# Patient Record
Sex: MALE
[De-identification: names, ages, dates, MRNs, and addresses within clinical notes are randomized per-mention and may not be internally consistent; named-entity substitution may affect disease eponyms.]

---

## 2024-08-12 PROBLEM — Z00.00 ENCOUNTER FOR PREVENTIVE HEALTH EXAMINATION: Status: ACTIVE | Noted: 2024-08-12

## 2024-08-13 ENCOUNTER — APPOINTMENT (OUTPATIENT)
Dept: CT IMAGING | Facility: HOSPITAL | Age: 54
End: 2024-08-13

## 2024-08-13 ENCOUNTER — APPOINTMENT (OUTPATIENT)
Dept: NUCLEAR MEDICINE | Facility: HOSPITAL | Age: 54
End: 2024-08-13

## 2024-09-05 ENCOUNTER — APPOINTMENT (OUTPATIENT)
Dept: UROLOGY | Facility: CLINIC | Age: 54
End: 2024-09-05
Payer: COMMERCIAL

## 2024-09-05 VITALS
WEIGHT: 190 LBS | HEIGHT: 74 IN | TEMPERATURE: 97.3 F | SYSTOLIC BLOOD PRESSURE: 160 MMHG | BODY MASS INDEX: 24.38 KG/M2 | HEART RATE: 78 BPM | DIASTOLIC BLOOD PRESSURE: 90 MMHG | OXYGEN SATURATION: 98 %

## 2024-09-05 DIAGNOSIS — C61 MALIGNANT NEOPLASM OF PROSTATE: ICD-10-CM

## 2024-09-05 DIAGNOSIS — R97.20 ELEVATED PROSTATE, SPECIFIC ANTIGEN [PSA]: ICD-10-CM

## 2024-09-05 PROCEDURE — 99204 OFFICE O/P NEW MOD 45 MIN: CPT

## 2024-09-05 NOTE — HISTORY OF PRESENT ILLNESS
[FreeTextEntry1] : CC: prostate cancer   Patient is a 53 year old man  He had a biopsy showing extensive CAP on right side, GS4+4=8 Left side only GG3 5% at left lateral apex  PSA was 23 ng/ml   Bone scan and CT negative  No MRI or PET/PSMA  No family history of prostate cancer  Currently working in construction  Denies any medical comorbidities at all  Denies any prior surgery of any kind

## 2024-09-05 NOTE — ASSESSMENT
[FreeTextEntry1] : Diagnosis: CAP   Today we discussed the natural history of localized prostate cancer, and the heterogeneous biology of this malignancy.  We discussed the fact that many prostate cancers are slow growing and unlikely to metastasize or cause death, whereas others can be life threatening.  We reviewed risk stratification, staging, Margarito scoring, and PSA levels as they pertain to risk.    All treatment options were reviewed.  This included active surveillance, androgen deprivation, emerging options such as focal therapy/HIFU/cryotherapy, radiation options (including IMRT, SBRT, brachytherapy) and surgical options (open vs. robotic surgery, nerve vs. non-nerve sparing).  Oncologic outcomes were compared and contrasted.  Risks of biochemical and clinical recurrence discussed.  Risks of needing adjuvant or salvage treatments reviewed.  We discussed the risks of secondary malignancy after radiation.  We discussed the opportunity for pathological staging with surgery vs. other options.  We discussed the possibility of positive margins, treatment failure, cancer recurrence and cancer-related death even with treatment.   All potential side effects of treatment were reviewed including, but not limited to: short term or permanent stress urinary incontinence and/or short term or permanent erectile dysfunction, penile shortening, rectal symptoms/pain, perineal pain, and other side effects.   All potential complications of treatment and surgery were reviewed including, but not limited to: risks of conversion from MIS to open surgery discussed, bleeding//life-threatening hemorrhage, rectal injury requiring colostomy or delayed recognition leading to fistula, vascular/bowel/adjacent visceral organ injury, trocar/access injury, the possibility of recognized vs. unrecognized/delayed-recognition injury, risks of thermal/blunt/sharp/retraction injury, risks of DVT, PE, MI, death, risks of cardiopulmonary/anesthesia related complications, positional injury, infection/collection/abscess, wound complications/dehiscence/seroma/cellulitis, urinoma/fistula, ureteral injury/obstruction, bladder neck contracture, penile shortening, meatal stenosis, urethral stricture, lymphocele, obturator nerve injury, prolonged anastomotic leak, and other complications.   PLAN  MRI prostate for anatomic delineation and local margin evaluation, SV, etc PET/PSMA for metastatic evaluation given improved sensitivity for metastatic disease  If localized disease, he has elected surgery Will tentatively book SP radical prostatectomy   Farhan Galo MD, FACS, FRCS  of Urology [facility] Director of Laparoscopic and Robotic Surgery [facility] Director of Urology, [facility] Professor of Urology   (Office) [phone] (Cell)  [phone] [email or web address]  The total amount of time I have personally spent preparing for this visit, reviewing the patient's test results, obtaining external history, ordering tests/medications, documenting clinical information, communicating with and counseling the patient/family and/or caregiver(s), and spent face to face with the patient explaining the above was minutes =45

## 2024-09-13 ENCOUNTER — APPOINTMENT (OUTPATIENT)
Dept: NUCLEAR MEDICINE | Facility: HOSPITAL | Age: 54
End: 2024-09-13
Payer: COMMERCIAL

## 2024-09-13 ENCOUNTER — APPOINTMENT (OUTPATIENT)
Dept: MRI IMAGING | Facility: HOSPITAL | Age: 54
End: 2024-09-13
Payer: COMMERCIAL

## 2024-09-13 ENCOUNTER — OUTPATIENT (OUTPATIENT)
Dept: OUTPATIENT SERVICES | Facility: HOSPITAL | Age: 54
LOS: 1 days | End: 2024-09-13
Payer: COMMERCIAL

## 2024-09-13 LAB — GLUCOSE BLDC GLUCOMTR-MCNC: 92 MG/DL — SIGNIFICANT CHANGE UP (ref 70–99)

## 2024-09-13 PROCEDURE — 78816 PET IMAGE W/CT FULL BODY: CPT

## 2024-09-13 PROCEDURE — 78816 PET IMAGE W/CT FULL BODY: CPT | Mod: 26

## 2024-09-13 PROCEDURE — 72197 MRI PELVIS W/O & W/DYE: CPT | Mod: 26

## 2024-09-13 PROCEDURE — 82962 GLUCOSE BLOOD TEST: CPT

## 2024-09-13 PROCEDURE — A9585: CPT

## 2024-09-13 PROCEDURE — 72197 MRI PELVIS W/O & W/DYE: CPT

## 2024-09-13 PROCEDURE — A9595: CPT

## 2024-09-25 NOTE — DISEASE MANAGEMENT
[>20] : >20 ng/mL [Biopsy with Fusion] : Patient had a biopsy with fusion on [8] : Fusion Biopsy Margarito Score: 8 [5] : 5 [BiopsyDate] : 09/2024 [MeasuredProstateVolume] : 38cc

## 2024-09-25 NOTE — HISTORY OF PRESENT ILLNESS
[FreeTextEntry1] :   Mr. Gonzalez [facility] is a 53-year-old male with no family history of Prostate Ca , with no significant past medical history being worked up for elevated PSA of 23 and underwent MRI prostate on 9/13/2024 that revealed PI-RADS 5 , right posterior medial-lateral, base to apex, peripheal zone. Tumor bulges or deforms capsule without neurovascular bundle thickening. Left posterior lateral, base, peripheral zone, tumor abuts but does not deform capsule. Outside pathology revealed Margarito Score 8 ( 4+4) GG3 5% at left lateral apex, a diagnosis of high-risk Prostatic Adenocarcinoma. Mr. Gonzalez is here today to discuss radiation therapy.  Urologist:   PSA Trend: ng/mL  /2024:  23ng/mL  MRI Prostate with and without contrast  9/9/2024  IMPRESSION: Prostate lesions as detailed above. PIRADS 5 - Very high (clinically significant cancer is highly likely to be present)  Tumor extension to the right seminal vesicle.  Prostate Volume: 38 mL PSA density: 0.47 ng/mL/mL  LESION: #1 Location: Right, posterior medial-lateral (PZpm-pl), base-to-apex, peripheral zone. Slice#: Series 4, Image 15. Size (transverse, AP, CC): 23 x 6 x 33 mm. T2-WI: 5 - Circumscribed, homogenous moderate hypointense focus/mass; greater than 1.5 cm in greatest dimension and invasive behavior. DWI: 5 - Focal markedly hypointense on ADC and markedly hyperintense on high b-value DWI; invasive behavior. DCE: Positive - focal, and; earlier than or contemporaneous with enhancement of adjacent normal prostatic tissues, and; corresponds to suspicious finding on T2W and/or DWI. Extra-prostatic extension: Tumor bulges or deforms capsule without neurovascular bundle thickening. Prior Comparison: None. PI-RADS Assessment Category: 5, Very High  LESION: #2 Location: Left, posterior lateral (PZpl), base, peripheral zone. Slice#: Series 4, Image 14. Size (transverse, AP, CC): 8 x 4 x 6 mm. T2-WI: 4 - Circumscribed, homogenous moderate hypointense focus/mass confined to the prostate; less than 1.5 cm in greatest dimension. DWI: 3 - Focal (discrete and different from the background) hypointense on ADC and/or focal hyperintense on high b-value DWI; may be markedly hypointense on ADC or markedly hyperintense on high b-value DWI, but not both. DCE: Negative - no early enhancement. Extra-prostatic extension: Abutment, tumor abuts but does not deform capsule. Prior Comparison: None. PI-RADS Assessment Category: 3, Intermediate  Neurovascular bundle: No evidence of neurovascular bundle invasion. Seminal vesicles: 1 cm focus in the right seminal vesicle consistent with tumor extension involving the right seminal vesicle (series 13 image 124) Lymph nodes: No pelvic adenopathy. Bones: No suspicious lesions identified. Urinary bladder: Within normal limits.   PATHOLOGY   Margarito 8 ( 4+4) GG3, 5% tissue involvement   PET/CT PSMA  9/5/2024  IMPRESSION:  1.  Abnormal uptake in the lesion in the right posterior prostate exclude to seminal vesicle corresponding to patient's known malignancy.  2.  Abnormal uptake within a nonenlarged right para-aortic lymph node which is nonspecific, however samir metastasis cannot be excluded.   Patient presents today for consideration of radiation therapy options to the prostate, referred by Dr. Glao.  Overall, the patient states he feels well and denies any radiation therapy in the past. He notes baseline urine function with and nocturia x___, while ------ using Flomax 0.4mg at night.  He denies urgency, urinary frequency, dribbling, urinary retention, dysuria, hematuria, leakage or incontinence. He has well-formed bowel movements. He denies blood or mucous in the stool. He denies rectal pain and states a history of ____hemorrhoids. He had his last colonoscopy in ____ and his next colonoscopy is due____. He _____ ADT therapy in the past. He is ____ to have erections and uses _______ as an ED medication with good result.

## 2024-09-26 ENCOUNTER — OUTPATIENT (OUTPATIENT)
Dept: OUTPATIENT SERVICES | Facility: HOSPITAL | Age: 54
LOS: 1 days | Discharge: HOME | End: 2024-09-26

## 2024-09-26 DIAGNOSIS — C61 MALIGNANT NEOPLASM OF PROSTATE: ICD-10-CM

## 2024-09-29 ENCOUNTER — NON-APPOINTMENT (OUTPATIENT)
Age: 54
End: 2024-09-29

## 2024-09-30 ENCOUNTER — APPOINTMENT (OUTPATIENT)
Dept: HEMATOLOGY ONCOLOGY | Facility: CLINIC | Age: 54
End: 2024-09-30
Payer: COMMERCIAL

## 2024-09-30 VITALS
TEMPERATURE: 96.4 F | WEIGHT: 209.44 LBS | OXYGEN SATURATION: 96 % | HEIGHT: 73.23 IN | BODY MASS INDEX: 27.46 KG/M2 | RESPIRATION RATE: 16 BRPM | SYSTOLIC BLOOD PRESSURE: 157 MMHG | DIASTOLIC BLOOD PRESSURE: 81 MMHG | HEART RATE: 76 BPM

## 2024-09-30 PROCEDURE — 99205 OFFICE O/P NEW HI 60 MIN: CPT

## 2024-10-01 PROBLEM — Z85.46 HISTORY OF MALIGNANT NEOPLASM OF PROSTATE: Status: RESOLVED | Noted: 2024-10-01 | Resolved: 2024-10-01

## 2024-10-02 ENCOUNTER — APPOINTMENT (OUTPATIENT)
Dept: RADIATION ONCOLOGY | Facility: CLINIC | Age: 54
End: 2024-10-02
Payer: COMMERCIAL

## 2024-10-02 ENCOUNTER — APPOINTMENT (OUTPATIENT)
Dept: HEMATOLOGY ONCOLOGY | Facility: CLINIC | Age: 54
End: 2024-10-02

## 2024-10-02 DIAGNOSIS — Z85.46 PERSONAL HISTORY OF MALIGNANT NEOPLASM OF PROSTATE: ICD-10-CM

## 2024-10-03 ENCOUNTER — NON-APPOINTMENT (OUTPATIENT)
Age: 54
End: 2024-10-03

## 2024-10-03 ENCOUNTER — APPOINTMENT (OUTPATIENT)
Dept: RADIATION ONCOLOGY | Facility: CLINIC | Age: 54
End: 2024-10-03
Payer: COMMERCIAL

## 2024-10-03 VITALS
HEART RATE: 67 BPM | OXYGEN SATURATION: 99 % | SYSTOLIC BLOOD PRESSURE: 134 MMHG | WEIGHT: 205 LBS | HEIGHT: 73 IN | TEMPERATURE: 98.1 F | RESPIRATION RATE: 16 BRPM | DIASTOLIC BLOOD PRESSURE: 86 MMHG | BODY MASS INDEX: 27.17 KG/M2

## 2024-10-03 DIAGNOSIS — C61 MALIGNANT NEOPLASM OF PROSTATE: ICD-10-CM

## 2024-10-03 PROCEDURE — G2211 COMPLEX E/M VISIT ADD ON: CPT | Mod: NC

## 2024-10-03 PROCEDURE — 99205 OFFICE O/P NEW HI 60 MIN: CPT

## 2024-10-03 RX ORDER — AMOXICILLIN AND CLAVULANATE POTASSIUM 875; 125 MG/1; MG/1
875-125 TABLET, COATED ORAL
Qty: 6 | Refills: 0 | Status: ACTIVE | COMMUNITY
Start: 2024-10-03 | End: 1900-01-01

## 2024-10-03 NOTE — ASSESSMENT
[FreeTextEntry1] : Nikolai Gonzalez is a 53 year old male without significant medical history who presents for further evaluation and management of recently diagnosed prostate cancer.   We had an extensive discussion with the patient to review his recent clinical course including biopsy and imaging results, which are consistent with prostate cancer. Based on his [place] score, PSA level, and seminal vesicle invasion, his disease is consistent with at least very high risk localized prostate cancer. On his PSMA PET/CT, there is concern for possible metastasis to retroperitoneal lymph node. We discussed that standard treatment recommendations for patients with both disease settings involve concurrent ADT and abiraterone/prednisone in conjunction with radiation therapy. ADT + izabel/pred are typically continued for 2 years. This regimen is associated with decreased risk of metastasis and improved overall survival. Both ADT and izabel/pred can be associated with significant side effects including fatigue, hot flashes, decreased libido, erectile dysfunction, muscle/bone weakness, and increased cardiovascular risk. Abiraterone can also be associated with hypertension, edema, LFT and electrolyte disturances.   After this discussion, he was amenable with this plan. He is scheduled to see Dr. Mtz this week for discussion of radiation options. We will facilitate initiation of ADT next week at [facility], at which time he will have baseline labs. Abiraterone and prednisone will be sent to [facility]. We will plan to monitor closely for toxicity.

## 2024-10-03 NOTE — REASON FOR VISIT
[Initial Consultation] : an initial consultation [Family Member] : family member [FreeTextEntry2] : Metastatic prostate cancer

## 2024-10-03 NOTE — ASSESSMENT
[FreeTextEntry1] : Nikolai Gonzalez is a 53 year old male without significant medical history who presents for further evaluation and management of recently diagnosed prostate cancer.   We had an extensive discussion with the patient to review his recent clinical course including biopsy and imaging results, which are consistent with prostate cancer. Based on his [place]nel[place] score, PSA level, and seminal vesicle invasion, his disease is consistent with at least very high risk localized prostate cancer. On his PSMA PET/CT, there is concern for possible metastasis to retroperitoneal lymph node. We discussed that standard treatment recommendations for patients with both disease settings involve concurrent ADT and abiraterone/prednisone in conjunction with radiation therapy. ADT + izabel/pred are typically continued for 2 years. This regimen is associated with decreased risk of metastasis and improved overall survival. Both ADT and izabel/pred can be associated with significant side effects including fatigue, hot flashes, decreased libido, erectile dysfunction, muscle/bone weakness, and increased cardiovascular risk. Abiraterone can also be associated with hypertension, edema, LFT and electrolyte disturances.   After this discussion, he was amenable with this plan. He is scheduled to see Dr. Mtz this week for discussion of radiation options. We will facilitate initiation of ADT next week at [facility], at which time he will have baseline labs. Abiraterone and prednisone will be sent to [facility]. We will plan to monitor closely for toxicity.

## 2024-10-03 NOTE — ASSESSMENT
[FreeTextEntry1] : Nikolai Gonzalez is a 53 year old male without significant medical history who presents for further evaluation and management of recently diagnosed prostate cancer.   We had an extensive discussion with the patient to review his recent clinical course including biopsy and imaging results, which are consistent with prostate cancer. Based on his [place] score, PSA level, and seminal vesicle invasion, his disease is consistent with at least very high risk localized prostate cancer. On his PSMA PET/CT, there is concern for possible metastasis to retroperitoneal lymph node. We discussed that standard treatment recommendations for patients with both disease settings involve concurrent ADT and abiraterone/prednisone in conjunction with radiation therapy. ADT + izabel/pred are typically continued for 2 years. This regimen is associated with decreased risk of metastasis and improved overall survival. Both ADT and izabel/pred can be associated with significant side effects including fatigue, hot flashes, decreased libido, erectile dysfunction, muscle/bone weakness, and increased cardiovascular risk. Abiraterone can also be associated with hypertension, edema, LFT and electrolyte disturances.   After this discussion, he was amenable with this plan. He is scheduled to see Dr. Mtz this week for discussion of radiation options. We will facilitate initiation of ADT next week at [facility], at which time he will have baseline labs. Abiraterone and prednisone will be sent to [facility]. We will plan to monitor closely for toxicity.

## 2024-10-03 NOTE — HISTORY OF PRESENT ILLNESS
[Disease: _____________________] : Disease: [unfilled] [T: ___] : T[unfilled] [N: ___] : N[unfilled] [M: ___] : M[unfilled] [AJCC Stage: ____] : AJCC Stage: [unfilled] [de-identified] : Nikolai Gonzalez is a 53-year-old male with recently diagnosed prostate cancer with possible lymph node involvement who presents for further evaluation and management.   7/20/2024: prostate biopsy Margarito 4+4 in 5 cores, 4+3 in 3 cores per outside report. PSA was found to be at 23 ng/mL.  8/26/2024: underwent CT A/P which did not reveal acute intra-abdominal or pelvic abnormality.  8/22/2024: NM Bone Scan which revealed degenerative type changes in the spine and extremities with no obvious suspicious focal uptake to suggest active osteoblastic metastatic disease.  9/5/2024:  PSMA PET/CT which reported abnormal uptake in the lesion in the right posterior prostate exclude to seminal vesicle. Abnormal uptake within a nonenlarged right para - aortic lymph node which is nonspecific, however samir metastasis can't be excluded. 9/13/2024: MRI Prostate revealed PIRADS 5 with tumor extension to the right seminal vesicle.   He reports feeling well without any focal symptoms at this time. Denies urinary symptoms including dysuria, hematuria, hesitancy.  [de-identified] : prostate adenocarcinoma [de-identified] : PSA 7/20/24: 23

## 2024-10-03 NOTE — HISTORY OF PRESENT ILLNESS
[Disease: _____________________] : Disease: [unfilled] [T: ___] : T[unfilled] [N: ___] : N[unfilled] [M: ___] : M[unfilled] [AJCC Stage: ____] : AJCC Stage: [unfilled] [de-identified] : Nikolai Gonzalez is a 53-year-old male with recently diagnosed prostate cancer with possible lymph node involvement who presents for further evaluation and management.   7/20/2024: prostate biopsy Margarito 4+4 in 5 cores, 4+3 in 3 cores per outside report. PSA was found to be at 23 ng/mL.  8/26/2024: underwent CT A/P which did not reveal acute intra-abdominal or pelvic abnormality.  8/22/2024: NM Bone Scan which revealed degenerative type changes in the spine and extremities with no obvious suspicious focal uptake to suggest active osteoblastic metastatic disease.  9/5/2024:  PSMA PET/CT which reported abnormal uptake in the lesion in the right posterior prostate exclude to seminal vesicle. Abnormal uptake within a nonenlarged right para - aortic lymph node which is nonspecific, however samir metastasis can't be excluded. 9/13/2024: MRI Prostate revealed PIRADS 5 with tumor extension to the right seminal vesicle.   He reports feeling well without any focal symptoms at this time. Denies urinary symptoms including dysuria, hematuria, hesitancy.  [de-identified] : prostate adenocarcinoma [de-identified] : PSA 7/20/24: 23

## 2024-10-03 NOTE — HISTORY OF PRESENT ILLNESS
[Disease: _____________________] : Disease: [unfilled] [T: ___] : T[unfilled] [N: ___] : N[unfilled] [M: ___] : M[unfilled] [AJCC Stage: ____] : AJCC Stage: [unfilled] [de-identified] : Nikolai Gonzalez is a 53-year-old male with recently diagnosed prostate cancer with possible lymph node involvement who presents for further evaluation and management.   7/20/2024: prostate biopsy Margarito 4+4 in 5 cores, 4+3 in 3 cores per outside report. PSA was found to be at 23 ng/mL.  8/26/2024: underwent CT A/P which did not reveal acute intra-abdominal or pelvic abnormality.  8/22/2024: NM Bone Scan which revealed degenerative type changes in the spine and extremities with no obvious suspicious focal uptake to suggest active osteoblastic metastatic disease.  9/5/2024:  PSMA PET/CT which reported abnormal uptake in the lesion in the right posterior prostate exclude to seminal vesicle. Abnormal uptake within a nonenlarged right para - aortic lymph node which is nonspecific, however samir metastasis can't be excluded. 9/13/2024: MRI Prostate revealed PIRADS 5 with tumor extension to the right seminal vesicle.   He reports feeling well without any focal symptoms at this time. Denies urinary symptoms including dysuria, hematuria, hesitancy.  [de-identified] : prostate adenocarcinoma [de-identified] : PSA 7/20/24: 23

## 2024-10-04 NOTE — HISTORY OF PRESENT ILLNESS
[FreeTextEntry1] :   Mr. Gonzalez [facility] is a 53-year-old male with no family history of Prostate Ca, with no significant past medical history being worked up for elevated PSA of 23 and underwent MRI prostate on 9/13/2024 that revealed PI-RADS 5, right posterior medial-lateral, base to apex, peripheral zone. Tumor bulges or deforms capsule without neurovascular bundle thickening. Left posterior lateral, base, peripheral zone, tumor abuts but does not deform capsule. Outside pathology revealed 5 out of 12 cores are positive, 30-80% tissue involvement , [place] Score 8 (4+4) GG3 5% at left lateral apex, a diagnosis of high-risk Prostatic Adenocarcinoma. Mr. Gonzalez is here today to discuss radiation therapy.  [place]  # [id number]  Urologist: Dr. Galo  PSA Trend: ng/mL 6/7/2024:  23ng/mL  MRI Prostate with and without contrast 9/9/2024  IMPRESSION: Prostate lesions as detailed above. PIRADS 5 - Very high (clinically significant cancer is highly likely to be present)  Tumor extension to the right seminal vesicle.  Prostate Volume: 38 mL PSA density: 0.47 ng/mL/mL  LESION: #1 Location: Right, posterior medial-lateral (PZpm-pl), base-to-apex, peripheral zone. Slice#: Series 4, Image 15. Size (transverse, AP, CC): 23 x 6 x 33 mm. T2-WI: 5 - Circumscribed, homogenous moderate hypointense focus/mass; greater than 1.5 cm in greatest dimension and invasive behavior. DWI: 5 - Focal markedly hypointense on ADC and markedly hyperintense on high b-value DWI; invasive behavior. DCE: Positive - focal, and; earlier than or contemporaneous with enhancement of adjacent normal prostatic tissues, and; corresponds to suspicious finding on T2W and/or DWI. Extra-prostatic extension: Tumor bulges or deforms capsule without neurovascular bundle thickening. Prior Comparison: None. PI-RADS Assessment Category: 5, Very High  LESION: #2 Location: Left, posterior lateral (PZpl), base, peripheral zone. Slice#: Series 4, Image 14. Size (transverse, AP, CC): 8 x 4 x 6 mm. T2-WI: 4 - Circumscribed, homogenous moderate hypointense focus/mass confined to the prostate; less than 1.5 cm in greatest dimension. DWI: 3 - Focal (discrete and different from the background) hypointense on ADC and/or focal hyperintense on high b-value DWI; may be markedly hypointense on ADC or markedly hyperintense on high b-value DWI, but not both. DCE: Negative - no early enhancement. Extra-prostatic extension: Abutment, tumor abuts but does not deform capsule. Prior Comparison: None. PI-RADS Assessment Category: 3, Intermediate  Neurovascular bundle: No evidence of neurovascular bundle invasion. Seminal vesicles: 1 cm focus in the right seminal vesicle consistent with tumor extension involving the right seminal vesicle (series 13 image 124) Lymph nodes: No pelvic adenopathy. Bones: No suspicious lesions identified. Urinary bladder: Within normal limits.   PATHOLOGY  5 out of 12 cores are positive,30-80% tissue involvement [place] 8 (4+4) GG3, 5% tissue involvement  PET/CT PSMA  9/5/2024  IMPRESSION: 1.  Abnormal uptake in the lesion in the right posterior prostate exclude to seminal vesicle corresponding to patient's known malignancy. 2.  Abnormal uptake within a nonenlarged right para-aortic lymph node which is nonspecific, however samir metastasis cannot be excluded.  Patient presents today for consideration of radiation therapy options to the prostate, referred by Dr. Galo.  Overall, the patient states he feels well and denies any radiation therapy in the past. He notes baseline urine function is at his normal with and nocturia x 0-1.  He denies urgency, urinary frequency, dribbling, urinary retention, dysuria, hematuria, leakage or incontinence. He has well-formed bowel movements. He denies blood or mucous in the stool. He denies rectal pain and states no history of hemorrhoids. He was recently seen by Dr. Walker and is planned to start hormone therapy in the near future. He is able to have erections, however is not living with his wife and is not sexually active. 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

## 2024-10-04 NOTE — HISTORY OF PRESENT ILLNESS
[FreeTextEntry1] :   Mr. Gonzalez [facility] is a 53-year-old male with no family history of Prostate Ca, with no significant past medical history being worked up for elevated PSA of 23 and underwent MRI prostate on 9/13/2024 that revealed PI-RADS 5, right posterior medial-lateral, base to apex, peripheral zone. Tumor bulges or deforms capsule without neurovascular bundle thickening. Left posterior lateral, base, peripheral zone, tumor abuts but does not deform capsule. Outside pathology revealed 5 out of 12 cores are positive, 30-80% tissue involvement , [place] Score 8 (4+4) GG3 5% at left lateral apex, a diagnosis of high-risk Prostatic Adenocarcinoma. Mr. Gonzalez is here today to discuss radiation therapy.  [place]  # [id number]  Urologist: Dr. Galo  PSA Trend: ng/mL 6/7/2024:  23ng/mL  MRI Prostate with and without contrast 9/9/2024  IMPRESSION: Prostate lesions as detailed above. PIRADS 5 - Very high (clinically significant cancer is highly likely to be present)  Tumor extension to the right seminal vesicle.  Prostate Volume: 38 mL PSA density: 0.47 ng/mL/mL  LESION: #1 Location: Right, posterior medial-lateral (PZpm-pl), base-to-apex, peripheral zone. Slice#: Series 4, Image 15. Size (transverse, AP, CC): 23 x 6 x 33 mm. T2-WI: 5 - Circumscribed, homogenous moderate hypointense focus/mass; greater than 1.5 cm in greatest dimension and invasive behavior. DWI: 5 - Focal markedly hypointense on ADC and markedly hyperintense on high b-value DWI; invasive behavior. DCE: Positive - focal, and; earlier than or contemporaneous with enhancement of adjacent normal prostatic tissues, and; corresponds to suspicious finding on T2W and/or DWI. Extra-prostatic extension: Tumor bulges or deforms capsule without neurovascular bundle thickening. Prior Comparison: None. PI-RADS Assessment Category: 5, Very High  LESION: #2 Location: Left, posterior lateral (PZpl), base, peripheral zone. Slice#: Series 4, Image 14. Size (transverse, AP, CC): 8 x 4 x 6 mm. T2-WI: 4 - Circumscribed, homogenous moderate hypointense focus/mass confined to the prostate; less than 1.5 cm in greatest dimension. DWI: 3 - Focal (discrete and different from the background) hypointense on ADC and/or focal hyperintense on high b-value DWI; may be markedly hypointense on ADC or markedly hyperintense on high b-value DWI, but not both. DCE: Negative - no early enhancement. Extra-prostatic extension: Abutment, tumor abuts but does not deform capsule. Prior Comparison: None. PI-RADS Assessment Category: 3, Intermediate  Neurovascular bundle: No evidence of neurovascular bundle invasion. Seminal vesicles: 1 cm focus in the right seminal vesicle consistent with tumor extension involving the right seminal vesicle (series 13 image 124) Lymph nodes: No pelvic adenopathy. Bones: No suspicious lesions identified. Urinary bladder: Within normal limits.   PATHOLOGY  5 out of 12 cores are positive,30-80% tissue involvement [place] 8 (4+4) GG3, 5% tissue involvement  PET/CT PSMA  9/5/2024  IMPRESSION: 1.  Abnormal uptake in the lesion in the right posterior prostate exclude to seminal vesicle corresponding to patient's known malignancy. 2.  Abnormal uptake within a nonenlarged right para-aortic lymph node which is nonspecific, however samir metastasis cannot be excluded.  Patient presents today for consideration of radiation therapy options to the prostate, referred by Dr. Galo.  Overall, the patient states he feels well and denies any radiation therapy in the past. He notes baseline urine function is at his normal with and nocturia x 0-1.  He denies urgency, urinary frequency, dribbling, urinary retention, dysuria, hematuria, leakage or incontinence. He has well-formed bowel movements. He denies blood or mucous in the stool. He denies rectal pain and states no history of hemorrhoids. He was recently seen by Dr. Walker and is planned to start hormone therapy in the near future. He is able to have erections, however is not living with his wife and is not sexually active. 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

## 2024-10-04 NOTE — HISTORY OF PRESENT ILLNESS
[FreeTextEntry1] :   Mr. Gonzalez [facility] is a 53-year-old male with no family history of Prostate Ca, with no significant past medical history being worked up for elevated PSA of 23 and underwent MRI prostate on 9/13/2024 that revealed PI-RADS 5, right posterior medial-lateral, base to apex, peripheral zone. Tumor bulges or deforms capsule without neurovascular bundle thickening. Left posterior lateral, base, peripheral zone, tumor abuts but does not deform capsule. 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 Mr. Gonzalez is here today to discuss radiation therapy.  [place]  # [id number]  Urologist: Dr. Galo  PSA Trend: ng/mL 6/7/2024:  23ng/mL  MRI Prostate with and without contrast 9/9/2024  IMPRESSION: Prostate lesions as detailed above. PIRADS 5 - Very high (clinically significant cancer is highly likely to be present)  Tumor extension to the right seminal vesicle.  Prostate Volume: 38 mL PSA density: 0.47 ng/mL/mL  LESION: #1 Location: Right, posterior medial-lateral (PZpm-pl), base-to-apex, peripheral zone. Slice#: Series 4, Image 15. Size (transverse, AP, CC): 23 x 6 x 33 mm. T2-WI: 5 - Circumscribed, homogenous moderate hypointense focus/mass; greater than 1.5 cm in greatest dimension and invasive behavior. DWI: 5 - Focal markedly hypointense on ADC and markedly hyperintense on high b-value DWI; invasive behavior. DCE: Positive - focal, and; earlier than or contemporaneous with enhancement of adjacent normal prostatic tissues, and; corresponds to suspicious finding on T2W and/or DWI. Extra-prostatic extension: Tumor bulges or deforms capsule without neurovascular bundle thickening. Prior Comparison: None. PI-RADS Assessment Category: 5, Very High  LESION: #2 Location: Left, posterior lateral (PZpl), base, peripheral zone. Slice#: Series 4, Image 14. Size (transverse, AP, CC): 8 x 4 x 6 mm. T2-WI: 4 - Circumscribed, homogenous moderate hypointense focus/mass confined to the prostate; less than 1.5 cm in greatest dimension. DWI: 3 - Focal (discrete and different from the background) hypointense on ADC and/or focal hyperintense on high b-value DWI; may be markedly hypointense on ADC or markedly hyperintense on high b-value DWI, but not both. DCE: Negative - no early enhancement. Extra-prostatic extension: Abutment, tumor abuts but does not deform capsule. Prior Comparison: None. PI-RADS Assessment Category: 3, Intermediate  Neurovascular bundle: No evidence of neurovascular bundle invasion. Seminal vesicles: 1 cm focus in the right seminal vesicle consistent with tumor extension involving the right seminal vesicle (series 13 image 124) Lymph nodes: No pelvic adenopathy. Bones: No suspicious lesions identified. Urinary bladder: Within normal limits.   PATHOLOGY  5 out of 12 cores are positive,30-80% tissue involvement [place] 8 (4+4) GG3, 5% tissue involvement  PET/CT PSMA  9/5/2024  IMPRESSION: 1.  Abnormal uptake in the lesion in the right posterior prostate exclude to seminal vesicle corresponding to patient's known malignancy. 2.  Abnormal uptake within a nonenlarged right para-aortic lymph node which is nonspecific, however samir metastasis cannot be excluded.  Patient presents today for consideration of radiation therapy options to the prostate, referred by Dr. Galo.  Overall, the patient states he feels well and denies any radiation therapy in the past. He notes baseline urine function is at his normal with and nocturia x 0-1.  He denies urgency, urinary frequency, dribbling, urinary retention, dysuria, hematuria, leakage or incontinence. He has well-formed bowel movements. He denies blood or mucous in the stool. He denies rectal pain and states no history of hemorrhoids. He was recently seen by Dr. Walker and is planned to start hormone therapy in the near future. He is able to have erections, however is not living with his wife and is not sexually active. 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

## 2024-10-04 NOTE — REVIEW OF SYSTEMS
[IPSS Score (0-40): ___] : IPSS score: [unfilled] [EPIC-CP Score (0-60): ___] : EPIC-CP score: [unfilled] [Negative] : Allergic/Immunologic [Anal Pain: Grade 0] : Anal Pain: Grade 0 [Constipation: Grade 0] : Constipation: Grade 0 [Diarrhea: Grade 0] : Diarrhea: Grade 0 [Dyspepsia: Grade 0] : Dyspepsia: Grade 0 [Dysphagia: Grade 0] : Dysphagia: Grade 0 [Esophagitis: Grade 0] : Esophagitis: Grade 0 [Fecal Incontinence: Grade 0] : Fecal Incontinence: Grade 0 [Gastroparesis: Grade 0] : Gastroparesis: Grade 0 [Nausea: Grade 0] : Nausea: Grade 0 [Proctitis: Grade 0] : Proctitis: Grade 0 [Rectal Pain: Grade 0] : Rectal Pain: Grade 0 [Small Intestinal Obstruction: Grade 0] : Small Intestinal Obstruction: Grade 0 [Vomiting: Grade 0] : Vomiting: Grade 0 [Hematuria: Grade 0] : Hematuria: Grade 0 [Urinary Incontinence: Grade 0] : Urinary Incontinence: Grade 0  [Urinary Retention: Grade 0] : Urinary Retention: Grade 0 [Urinary Tract Pain: Grade 0] : Urinary Tract Pain: Grade 0 [Urinary Urgency: Grade 0] : Urinary Urgency: Grade 0 [Urinary Frequency: Grade 0] : Urinary Frequency: Grade 0 [Ejaculation Disorder: Grade 0] : Ejaculation Disorder: Grade 0 [Erectile Dysfunction: Grade 0] : Erectile Dysfunction: Grade 0 [Nocturia] : no nocturia [FreeTextEntry8] : nocturia X1 at the most

## 2024-10-04 NOTE — DISEASE MANAGEMENT
[MICK] : MICK [3] : T3 [BiopsyDate] : 09/2024 [MeasuredProstateVolume] : 38cc [TotalCores] : 12 [TotalPositiveCores] : 5 [MaxCoreInvolvement] : 80% [FreeTextEntry7] : PET/PSMA 9/13 revealed abnormal uptake within a nonenlarged right par aortic lymph node which is nonspecific, however samir metastasis cannot be excluded

## 2024-10-07 LAB
25(OH)D3 SERPL-MCNC: 28.7 NG/ML
CALCIUM SERPL-MCNC: 9.4 MG/DL
TESTOST SERPL-MCNC: 489 NG/DL

## 2024-10-09 ENCOUNTER — APPOINTMENT (OUTPATIENT)
Dept: HEMATOLOGY ONCOLOGY | Facility: CLINIC | Age: 54
End: 2024-10-09
Payer: COMMERCIAL

## 2024-10-09 VITALS
TEMPERATURE: 96.1 F | OXYGEN SATURATION: 98 % | RESPIRATION RATE: 18 BRPM | HEART RATE: 89 BPM | DIASTOLIC BLOOD PRESSURE: 91 MMHG | SYSTOLIC BLOOD PRESSURE: 148 MMHG | WEIGHT: 210 LBS | HEIGHT: 73 IN | BODY MASS INDEX: 27.83 KG/M2

## 2024-10-09 PROCEDURE — 99214 OFFICE O/P EST MOD 30 MIN: CPT

## 2024-10-09 RX ORDER — PREDNISONE 5 MG/1
5 TABLET ORAL
Qty: 30 | Refills: 2 | Status: ACTIVE | COMMUNITY
Start: 2024-10-09 | End: 1900-01-01

## 2024-10-09 RX ORDER — ABIRATERONE ACETATE 500 MG/1
500 TABLET, FILM COATED ORAL DAILY
Qty: 60 | Refills: 2 | Status: ACTIVE | COMMUNITY
Start: 2024-10-09 | End: 1900-01-01

## 2024-10-10 LAB — PSA SERPL-MCNC: 30.5 NG/ML

## 2024-10-15 ENCOUNTER — OUTPATIENT (OUTPATIENT)
Dept: OUTPATIENT SERVICES | Facility: HOSPITAL | Age: 54
LOS: 1 days | End: 2024-10-15
Payer: COMMERCIAL

## 2024-10-15 ENCOUNTER — APPOINTMENT (OUTPATIENT)
Dept: INFUSION THERAPY | Facility: CLINIC | Age: 54
End: 2024-10-15

## 2024-10-15 VITALS
HEIGHT: 72 IN | OXYGEN SATURATION: 97 % | DIASTOLIC BLOOD PRESSURE: 87 MMHG | TEMPERATURE: 98 F | WEIGHT: 201.94 LBS | RESPIRATION RATE: 18 BRPM | SYSTOLIC BLOOD PRESSURE: 149 MMHG | HEART RATE: 72 BPM

## 2024-10-15 DIAGNOSIS — C61 MALIGNANT NEOPLASM OF PROSTATE: ICD-10-CM

## 2024-10-15 PROCEDURE — 96402 CHEMO HORMON ANTINEOPL SQ/IM: CPT

## 2024-10-15 RX ORDER — LEUPROLIDE ACETATE 45 MG
22.5 KIT SUBCUTANEOUS ONCE
Refills: 0 | Status: COMPLETED | OUTPATIENT
Start: 2024-10-15 | End: 2024-10-15

## 2024-10-15 RX ADMIN — LEUPROLIDE ACETATE 22.5 MILLIGRAM(S): KIT SUBCUTANEOUS at 17:13

## 2024-10-16 LAB
TESTOST FREE SERPL-MCNC: 15.1 PG/ML
TESTOST SERPL-MCNC: 588 NG/DL

## 2024-10-17 ENCOUNTER — RESULT REVIEW (OUTPATIENT)
Age: 54
End: 2024-10-17

## 2024-10-17 ENCOUNTER — OUTPATIENT (OUTPATIENT)
Dept: OUTPATIENT SERVICES | Facility: HOSPITAL | Age: 54
LOS: 1 days | End: 2024-10-17
Payer: COMMERCIAL

## 2024-10-17 DIAGNOSIS — C61 MALIGNANT NEOPLASM OF PROSTATE: ICD-10-CM

## 2024-10-17 LAB — SURGICAL PATHOLOGY STUDY: SIGNIFICANT CHANGE UP

## 2024-10-17 PROCEDURE — 88321 CONSLTJ&REPRT SLD PREP ELSWR: CPT

## 2024-11-06 ENCOUNTER — APPOINTMENT (OUTPATIENT)
Dept: HEMATOLOGY ONCOLOGY | Facility: CLINIC | Age: 54
End: 2024-11-06
Payer: COMMERCIAL

## 2024-11-06 VITALS
HEIGHT: 73 IN | BODY MASS INDEX: 27.7 KG/M2 | RESPIRATION RATE: 18 BRPM | WEIGHT: 209 LBS | DIASTOLIC BLOOD PRESSURE: 90 MMHG | OXYGEN SATURATION: 97 % | HEART RATE: 84 BPM | TEMPERATURE: 98.4 F | SYSTOLIC BLOOD PRESSURE: 146 MMHG

## 2024-11-06 PROCEDURE — 99214 OFFICE O/P EST MOD 30 MIN: CPT

## 2024-11-09 LAB
ALBUMIN SERPL ELPH-MCNC: 3.9 G/DL
ALP BLD-CCNC: 60 U/L
ALT SERPL-CCNC: 20 U/L
ANION GAP SERPL CALC-SCNC: 1 MMOL/L
AST SERPL-CCNC: 24 U/L
BILIRUB SERPL-MCNC: 2.4 MG/DL
BUN SERPL-MCNC: 14 MG/DL
CALCIUM SERPL-MCNC: 9.7 MG/DL
CHLORIDE SERPL-SCNC: 108 MMOL/L
CO2 SERPL-SCNC: 29 MMOL/L
CREAT SERPL-MCNC: 0.9 MG/DL
EGFR: 100 ML/MIN/1.73M2
GLUCOSE SERPL-MCNC: 109 MG/DL
HCT VFR BLD CALC: 38.7 %
HGB BLD-MCNC: 13.2 G/DL
MCHC RBC-ENTMCNC: 31.4 PG
MCHC RBC-ENTMCNC: 34.1 G/DL
MCV RBC AUTO: 91.9 FL
PLATELET # BLD AUTO: 256 K/UL
POTASSIUM SERPL-SCNC: 4.6 MMOL/L
PROT SERPL-MCNC: 7.1 G/DL
PSA FREE FLD-MCNC: 8 %
PSA FREE SERPL-MCNC: 0.12 NG/ML
PSA SERPL-MCNC: 1.52 NG/ML
RBC # BLD: 4.21 M/UL
RBC # FLD: 12.4 %
SODIUM SERPL-SCNC: 138 MMOL/L
WBC # FLD AUTO: 9.4 K/UL

## 2024-11-11 ENCOUNTER — OUTPATIENT (OUTPATIENT)
Dept: OUTPATIENT SERVICES | Facility: HOSPITAL | Age: 54
LOS: 1 days | End: 2024-11-11

## 2024-11-11 ENCOUNTER — RESULT REVIEW (OUTPATIENT)
Age: 54
End: 2024-11-11

## 2024-11-11 ENCOUNTER — APPOINTMENT (OUTPATIENT)
Dept: RADIOLOGY | Facility: HOSPITAL | Age: 54
End: 2024-11-11

## 2024-11-11 ENCOUNTER — NON-APPOINTMENT (OUTPATIENT)
Age: 54
End: 2024-11-11

## 2024-11-11 PROCEDURE — 77080 DXA BONE DENSITY AXIAL: CPT

## 2024-11-11 PROCEDURE — 77080 DXA BONE DENSITY AXIAL: CPT | Mod: 26

## 2024-11-17 LAB
ALBUMIN SERPL ELPH-MCNC: 4.1 G/DL
ALP BLD-CCNC: 63 U/L
ALT SERPL-CCNC: 14 U/L
AST SERPL-CCNC: 18 U/L
BILIRUB DIRECT SERPL-MCNC: 0.3 MG/DL
BILIRUB INDIRECT SERPL-MCNC: 1.1 MG/DL
BILIRUB SERPL-MCNC: 1.4 MG/DL
PROT SERPL-MCNC: 7.4 G/DL

## 2024-11-25 ENCOUNTER — NON-APPOINTMENT (OUTPATIENT)
Age: 54
End: 2024-11-25

## 2024-12-02 ENCOUNTER — NON-APPOINTMENT (OUTPATIENT)
Age: 54
End: 2024-12-02

## 2024-12-09 ENCOUNTER — NON-APPOINTMENT (OUTPATIENT)
Age: 54
End: 2024-12-09

## 2024-12-09 ENCOUNTER — RX RENEWAL (OUTPATIENT)
Age: 54
End: 2024-12-09

## 2024-12-16 ENCOUNTER — NON-APPOINTMENT (OUTPATIENT)
Age: 54
End: 2024-12-16

## 2024-12-18 ENCOUNTER — LABORATORY RESULT (OUTPATIENT)
Age: 54
End: 2024-12-18

## 2024-12-18 ENCOUNTER — APPOINTMENT (OUTPATIENT)
Dept: HEMATOLOGY ONCOLOGY | Facility: CLINIC | Age: 54
End: 2024-12-18
Payer: COMMERCIAL

## 2024-12-18 VITALS
BODY MASS INDEX: 26.9 KG/M2 | DIASTOLIC BLOOD PRESSURE: 98 MMHG | SYSTOLIC BLOOD PRESSURE: 161 MMHG | HEIGHT: 73 IN | WEIGHT: 203 LBS | TEMPERATURE: 98.8 F | HEART RATE: 92 BPM | RESPIRATION RATE: 18 BRPM | OXYGEN SATURATION: 98 %

## 2024-12-18 DIAGNOSIS — C61 MALIGNANT NEOPLASM OF PROSTATE: ICD-10-CM

## 2024-12-18 PROCEDURE — 99215 OFFICE O/P EST HI 40 MIN: CPT

## 2024-12-18 RX ORDER — TAMSULOSIN HYDROCHLORIDE 0.4 MG/1
0.4 CAPSULE ORAL
Qty: 30 | Refills: 5 | Status: ACTIVE | COMMUNITY
Start: 2024-12-09

## 2024-12-19 LAB
BASOPHILS # BLD AUTO: 0.03 K/UL
BASOPHILS NFR BLD AUTO: 0.9 %
EOSINOPHIL # BLD AUTO: 0.14 K/UL
EOSINOPHIL NFR BLD AUTO: 5.1 %
HCT VFR BLD CALC: 37.5 %
HGB BLD-MCNC: 12.4 G/DL
LYMPHOCYTES # BLD AUTO: 0.26 K/UL
LYMPHOCYTES NFR BLD AUTO: 9.4 %
MAN DIFF?: NORMAL
MCHC RBC-ENTMCNC: 30.2 PG
MCHC RBC-ENTMCNC: 33.1 G/DL
MCV RBC AUTO: 91.2 FL
MONOCYTES # BLD AUTO: 0.36 K/UL
MONOCYTES NFR BLD AUTO: 12.8 %
NEUTROPHILS # BLD AUTO: 1.99 K/UL
NEUTROPHILS NFR BLD AUTO: 70.9 %
PLATELET # BLD AUTO: 249 K/UL
RBC # BLD: 4.11 M/UL
RBC # FLD: 12.4 %
WBC # FLD AUTO: 2.8 K/UL

## 2024-12-23 ENCOUNTER — NON-APPOINTMENT (OUTPATIENT)
Age: 54
End: 2024-12-23

## 2024-12-26 ENCOUNTER — NON-APPOINTMENT (OUTPATIENT)
Age: 54
End: 2024-12-26

## 2025-01-08 ENCOUNTER — APPOINTMENT (OUTPATIENT)
Dept: HEMATOLOGY ONCOLOGY | Facility: CLINIC | Age: 55
End: 2025-01-08
Payer: COMMERCIAL

## 2025-01-08 ENCOUNTER — OUTPATIENT (OUTPATIENT)
Dept: OUTPATIENT SERVICES | Facility: HOSPITAL | Age: 55
LOS: 1 days | End: 2025-01-08
Payer: COMMERCIAL

## 2025-01-08 ENCOUNTER — APPOINTMENT (OUTPATIENT)
Dept: INFUSION THERAPY | Facility: CLINIC | Age: 55
End: 2025-01-08

## 2025-01-08 VITALS
RESPIRATION RATE: 18 BRPM | HEART RATE: 83 BPM | WEIGHT: 207.9 LBS | SYSTOLIC BLOOD PRESSURE: 158 MMHG | HEIGHT: 72 IN | TEMPERATURE: 98 F | OXYGEN SATURATION: 97 % | DIASTOLIC BLOOD PRESSURE: 96 MMHG

## 2025-01-08 VITALS
TEMPERATURE: 98.5 F | OXYGEN SATURATION: 97 % | RESPIRATION RATE: 18 BRPM | HEART RATE: 83 BPM | SYSTOLIC BLOOD PRESSURE: 158 MMHG | WEIGHT: 208 LBS | BODY MASS INDEX: 27.57 KG/M2 | DIASTOLIC BLOOD PRESSURE: 96 MMHG | HEIGHT: 73 IN

## 2025-01-08 DIAGNOSIS — C61 MALIGNANT NEOPLASM OF PROSTATE: ICD-10-CM

## 2025-01-08 PROCEDURE — 99215 OFFICE O/P EST HI 40 MIN: CPT

## 2025-01-08 PROCEDURE — 96402 CHEMO HORMON ANTINEOPL SQ/IM: CPT

## 2025-01-08 RX ORDER — LEUPROLIDE ACETATE 7.5 MG
22.5 KIT SUBCUTANEOUS ONCE
Refills: 0 | Status: COMPLETED | OUTPATIENT
Start: 2025-01-08 | End: 2025-01-08

## 2025-01-08 RX ADMIN — LEUPROLIDE ACETATE 22.5 MILLIGRAM(S): KIT SUBCUTANEOUS at 16:34

## 2025-01-09 ENCOUNTER — NON-APPOINTMENT (OUTPATIENT)
Age: 55
End: 2025-01-09

## 2025-01-22 ENCOUNTER — NON-APPOINTMENT (OUTPATIENT)
Age: 55
End: 2025-01-22

## 2025-02-12 ENCOUNTER — APPOINTMENT (OUTPATIENT)
Dept: RADIATION ONCOLOGY | Facility: CLINIC | Age: 55
End: 2025-02-12
Payer: COMMERCIAL

## 2025-02-12 VITALS
HEART RATE: 83 BPM | DIASTOLIC BLOOD PRESSURE: 99 MMHG | HEIGHT: 73 IN | OXYGEN SATURATION: 100 % | BODY MASS INDEX: 27.17 KG/M2 | SYSTOLIC BLOOD PRESSURE: 168 MMHG | TEMPERATURE: 98.2 F | RESPIRATION RATE: 18 BRPM | WEIGHT: 205 LBS

## 2025-02-12 PROCEDURE — 99212 OFFICE O/P EST SF 10 MIN: CPT

## 2025-02-18 DIAGNOSIS — C61 MALIGNANT NEOPLASM OF PROSTATE: ICD-10-CM

## 2025-02-19 ENCOUNTER — APPOINTMENT (OUTPATIENT)
Dept: HEMATOLOGY ONCOLOGY | Facility: CLINIC | Age: 55
End: 2025-02-19
Payer: COMMERCIAL

## 2025-02-19 VITALS
RESPIRATION RATE: 18 BRPM | BODY MASS INDEX: 26.9 KG/M2 | HEIGHT: 72.83 IN | WEIGHT: 203 LBS | DIASTOLIC BLOOD PRESSURE: 82 MMHG | OXYGEN SATURATION: 96 % | TEMPERATURE: 99.2 F | HEART RATE: 81 BPM | SYSTOLIC BLOOD PRESSURE: 144 MMHG

## 2025-02-19 DIAGNOSIS — I10 ESSENTIAL (PRIMARY) HYPERTENSION: ICD-10-CM

## 2025-02-19 PROCEDURE — 99215 OFFICE O/P EST HI 40 MIN: CPT

## 2025-02-19 RX ORDER — AMLODIPINE BESYLATE 5 MG/1
5 TABLET ORAL DAILY
Qty: 30 | Refills: 2 | Status: ACTIVE | COMMUNITY
Start: 2025-02-19 | End: 1900-01-01

## 2025-02-20 ENCOUNTER — NON-APPOINTMENT (OUTPATIENT)
Age: 55
End: 2025-02-20

## 2025-02-20 LAB
HCT VFR BLD CALC: 36.8 %
HGB BLD-MCNC: 12.6 G/DL
LYMPHOCYTES # BLD AUTO: 0.7 K/UL
LYMPHOCYTES NFR BLD AUTO: 23.6 %
MAN DIFF?: NO
MCHC RBC-ENTMCNC: 31.2 PG
MCHC RBC-ENTMCNC: 34.2 G/DL
MCV RBC AUTO: 91.1 FL
NEUTROPHILS # BLD AUTO: 1.9 K/UL
NEUTROPHILS NFR BLD AUTO: 62.9 %
PLATELET # BLD AUTO: 246 K/UL
RBC # BLD: 4.04 M/UL
RBC # FLD: 12.9 %
WBC # FLD AUTO: 3 K/UL

## 2025-02-22 ENCOUNTER — NON-APPOINTMENT (OUTPATIENT)
Age: 55
End: 2025-02-22

## 2025-02-22 LAB
ALBUMIN SERPL ELPH-MCNC: 3.8 G/DL
ALP BLD-CCNC: 55 U/L
ALT SERPL-CCNC: 21 U/L
ANION GAP SERPL CALC-SCNC: 3 MMOL/L
AST SERPL-CCNC: 30 U/L
BILIRUB SERPL-MCNC: 2 MG/DL
BUN SERPL-MCNC: 14 MG/DL
CALCIUM SERPL-MCNC: 9.5 MG/DL
CHLORIDE SERPL-SCNC: 113 MMOL/L
CO2 SERPL-SCNC: 28 MMOL/L
CREAT SERPL-MCNC: 0.6 MG/DL
EGFR: 113 ML/MIN/1.73M2
GLUCOSE SERPL-MCNC: 126 MG/DL
POTASSIUM SERPL-SCNC: 4.5 MMOL/L
PROT SERPL-MCNC: 6.8 G/DL
PSA FREE FLD-MCNC: NORMAL %
PSA FREE SERPL-MCNC: <0.01 NG/ML
PSA SERPL-MCNC: <0.01 NG/ML
SODIUM SERPL-SCNC: 144 MMOL/L

## 2025-03-05 ENCOUNTER — RX RENEWAL (OUTPATIENT)
Age: 55
End: 2025-03-05

## 2025-03-12 ENCOUNTER — APPOINTMENT (OUTPATIENT)
Dept: HEMATOLOGY ONCOLOGY | Facility: CLINIC | Age: 55
End: 2025-03-12

## 2025-04-01 DIAGNOSIS — C61 MALIGNANT NEOPLASM OF PROSTATE: ICD-10-CM

## 2025-04-02 ENCOUNTER — APPOINTMENT (OUTPATIENT)
Dept: INFUSION THERAPY | Facility: CLINIC | Age: 55
End: 2025-04-02

## 2025-04-02 ENCOUNTER — APPOINTMENT (OUTPATIENT)
Dept: HEMATOLOGY ONCOLOGY | Facility: CLINIC | Age: 55
End: 2025-04-02
Payer: COMMERCIAL

## 2025-04-02 ENCOUNTER — OUTPATIENT (OUTPATIENT)
Dept: OUTPATIENT SERVICES | Facility: HOSPITAL | Age: 55
LOS: 1 days | End: 2025-04-02
Payer: COMMERCIAL

## 2025-04-02 VITALS
WEIGHT: 201.06 LBS | DIASTOLIC BLOOD PRESSURE: 81 MMHG | SYSTOLIC BLOOD PRESSURE: 140 MMHG | TEMPERATURE: 98 F | HEIGHT: 72 IN | OXYGEN SATURATION: 98 % | HEART RATE: 88 BPM | RESPIRATION RATE: 18 BRPM

## 2025-04-02 VITALS
RESPIRATION RATE: 18 BRPM | DIASTOLIC BLOOD PRESSURE: 84 MMHG | TEMPERATURE: 98.1 F | HEIGHT: 72.83 IN | WEIGHT: 201 LBS | BODY MASS INDEX: 26.64 KG/M2 | HEART RATE: 88 BPM | SYSTOLIC BLOOD PRESSURE: 146 MMHG | OXYGEN SATURATION: 97 %

## 2025-04-02 DIAGNOSIS — C61 MALIGNANT NEOPLASM OF PROSTATE: ICD-10-CM

## 2025-04-02 LAB
ALBUMIN SERPL ELPH-MCNC: 3.6 G/DL
ALP BLD-CCNC: 60 U/L
ALT SERPL-CCNC: 19 U/L
ANION GAP SERPL CALC-SCNC: 2 MMOL/L
AST SERPL-CCNC: 27 U/L
BILIRUB SERPL-MCNC: 1.7 MG/DL
BUN SERPL-MCNC: 16 MG/DL
CALCIUM SERPL-MCNC: 9.8 MG/DL
CHLORIDE SERPL-SCNC: 114 MMOL/L
CO2 SERPL-SCNC: 27 MMOL/L
CREAT SERPL-MCNC: 1 MG/DL
EGFRCR SERPLBLD CKD-EPI 2021: 88 ML/MIN/1.73M2
GLUCOSE SERPL-MCNC: 145 MG/DL
HCT VFR BLD CALC: 37.9 %
HGB BLD-MCNC: 13.1 G/DL
LYMPHOCYTES # BLD AUTO: 0.9 K/UL
LYMPHOCYTES NFR BLD AUTO: 24.3 %
MAN DIFF?: NO
MCHC RBC-ENTMCNC: 31.1 PG
MCHC RBC-ENTMCNC: 34.6 G/DL
MCV RBC AUTO: 90 FL
NEUTROPHILS # BLD AUTO: 2.4 K/UL
NEUTROPHILS NFR BLD AUTO: 65 %
PLATELET # BLD AUTO: 267 K/UL
POTASSIUM SERPL-SCNC: 5.1 MMOL/L
PROT SERPL-MCNC: 6.6 G/DL
RBC # BLD: 4.21 M/UL
RBC # FLD: 12.3 %
SODIUM SERPL-SCNC: 143 MMOL/L
WBC # FLD AUTO: 3.7 K/UL

## 2025-04-02 PROCEDURE — 96402 CHEMO HORMON ANTINEOPL SQ/IM: CPT

## 2025-04-02 PROCEDURE — 99215 OFFICE O/P EST HI 40 MIN: CPT

## 2025-04-02 RX ORDER — LEUPROLIDE 42 MG/.37G
22.5 INJECTION, EMULSION SUBCUTANEOUS ONCE
Refills: 0 | Status: COMPLETED | OUTPATIENT
Start: 2025-04-02 | End: 2025-04-02

## 2025-04-02 RX ADMIN — LEUPROLIDE 22.5 MILLIGRAM(S): 42 INJECTION, EMULSION SUBCUTANEOUS at 17:00

## 2025-04-03 ENCOUNTER — NON-APPOINTMENT (OUTPATIENT)
Age: 55
End: 2025-04-03

## 2025-04-03 LAB
PSA FREE FLD-MCNC: NORMAL %
PSA FREE SERPL-MCNC: <0.01 NG/ML
PSA SERPL-MCNC: <0.01 NG/ML

## 2025-05-14 ENCOUNTER — APPOINTMENT (OUTPATIENT)
Dept: HEMATOLOGY ONCOLOGY | Facility: CLINIC | Age: 55
End: 2025-05-14
Payer: COMMERCIAL

## 2025-05-14 VITALS
HEIGHT: 72.44 IN | OXYGEN SATURATION: 98 % | WEIGHT: 205 LBS | BODY MASS INDEX: 27.47 KG/M2 | HEART RATE: 89 BPM | TEMPERATURE: 98.4 F | RESPIRATION RATE: 18 BRPM

## 2025-05-14 PROCEDURE — 99215 OFFICE O/P EST HI 40 MIN: CPT

## 2025-05-16 LAB
ALBUMIN SERPL ELPH-MCNC: 3.7 G/DL
ALP BLD-CCNC: 59 U/L
ALT SERPL-CCNC: 14 U/L
ANION GAP SERPL CALC-SCNC: 5 MMOL/L
AST SERPL-CCNC: 18 U/L
BILIRUB SERPL-MCNC: 1.9 MG/DL
BUN SERPL-MCNC: 19 MG/DL
CALCIUM SERPL-MCNC: 9.9 MG/DL
CHLORIDE SERPL-SCNC: 110 MMOL/L
CO2 SERPL-SCNC: 27 MMOL/L
CREAT SERPL-MCNC: 1.2 MG/DL
EGFRCR SERPLBLD CKD-EPI 2021: 71 ML/MIN/1.73M2
GLUCOSE SERPL-MCNC: 107 MG/DL
HCT VFR BLD CALC: 39.1 %
HGB BLD-MCNC: 13.7 G/DL
MCHC RBC-ENTMCNC: 31.3 PG
MCHC RBC-ENTMCNC: 35 G/DL
MCV RBC AUTO: 89.3 FL
PLATELET # BLD AUTO: 261 K/UL
POTASSIUM SERPL-SCNC: 4.5 MMOL/L
PROT SERPL-MCNC: 6.9 G/DL
PSA SERPL-MCNC: <0.01 NG/ML
RBC # BLD: 4.38 M/UL
RBC # FLD: 12.7 %
SODIUM SERPL-SCNC: 142 MMOL/L
WBC # FLD AUTO: 4.9 K/UL

## 2025-05-31 ENCOUNTER — RX RENEWAL (OUTPATIENT)
Age: 55
End: 2025-05-31

## 2025-06-25 ENCOUNTER — OUTPATIENT (OUTPATIENT)
Dept: OUTPATIENT SERVICES | Facility: HOSPITAL | Age: 55
LOS: 1 days | End: 2025-06-25
Payer: COMMERCIAL

## 2025-06-25 ENCOUNTER — APPOINTMENT (OUTPATIENT)
Dept: HEMATOLOGY ONCOLOGY | Facility: CLINIC | Age: 55
End: 2025-06-25
Payer: COMMERCIAL

## 2025-06-25 ENCOUNTER — APPOINTMENT (OUTPATIENT)
Dept: INFUSION THERAPY | Facility: CLINIC | Age: 55
End: 2025-06-25

## 2025-06-25 ENCOUNTER — NON-APPOINTMENT (OUTPATIENT)
Age: 55
End: 2025-06-25

## 2025-06-25 VITALS
OXYGEN SATURATION: 98 % | SYSTOLIC BLOOD PRESSURE: 169 MMHG | HEART RATE: 85 BPM | HEIGHT: 72.44 IN | BODY MASS INDEX: 26.39 KG/M2 | DIASTOLIC BLOOD PRESSURE: 104 MMHG | RESPIRATION RATE: 18 BRPM | WEIGHT: 197 LBS

## 2025-06-25 VITALS
TEMPERATURE: 98 F | HEART RATE: 82 BPM | SYSTOLIC BLOOD PRESSURE: 138 MMHG | RESPIRATION RATE: 18 BRPM | DIASTOLIC BLOOD PRESSURE: 89 MMHG | OXYGEN SATURATION: 98 % | HEIGHT: 72 IN | WEIGHT: 197.09 LBS

## 2025-06-25 DIAGNOSIS — M81.0 AGE-RELATED OSTEOPOROSIS WITHOUT CURRENT PATHOLOGICAL FRACTURE: ICD-10-CM

## 2025-06-25 PROCEDURE — 96402 CHEMO HORMON ANTINEOPL SQ/IM: CPT

## 2025-06-25 PROCEDURE — 99214 OFFICE O/P EST MOD 30 MIN: CPT

## 2025-06-25 RX ORDER — LEUPROLIDE 42 MG/.37G
22.5 INJECTION, EMULSION SUBCUTANEOUS ONCE
Refills: 0 | Status: COMPLETED | OUTPATIENT
Start: 2025-06-25 | End: 2025-06-25

## 2025-06-25 RX ADMIN — LEUPROLIDE 22.5 MILLIGRAM(S): 42 INJECTION, EMULSION SUBCUTANEOUS at 16:48

## 2025-06-27 LAB
ALBUMIN SERPL ELPH-MCNC: 3.8 G/DL
ALP BLD-CCNC: 69 U/L
ALT SERPL-CCNC: 14 U/L
ANION GAP SERPL CALC-SCNC: 6 MMOL/L
AST SERPL-CCNC: 22 U/L
BILIRUB SERPL-MCNC: 2.4 MG/DL
BUN SERPL-MCNC: 13 MG/DL
CALCIUM SERPL-MCNC: 9.3 MG/DL
CHLORIDE SERPL-SCNC: 114 MMOL/L
CO2 SERPL-SCNC: 28 MMOL/L
CREAT SERPL-MCNC: 1.1 MG/DL
EGFRCR SERPLBLD CKD-EPI 2021: 79 ML/MIN/1.73M2
GLUCOSE SERPL-MCNC: 115 MG/DL
HCT VFR BLD CALC: 37.4 %
HGB BLD-MCNC: 13 G/DL
LYMPHOCYTES # BLD AUTO: 1.3 K/UL
LYMPHOCYTES NFR BLD AUTO: 28.5 %
MAN DIFF?: NO
MCHC RBC-ENTMCNC: 31.5 PG
MCHC RBC-ENTMCNC: 34.8 G/DL
MCV RBC AUTO: 90.6 FL
NEUTROPHILS # BLD AUTO: 2.8 K/UL
NEUTROPHILS NFR BLD AUTO: 63.6 %
PLATELET # BLD AUTO: 268 K/UL
POTASSIUM SERPL-SCNC: 4 MMOL/L
PROT SERPL-MCNC: 7 G/DL
PSA SERPL-MCNC: <0.01 NG/ML
RBC # BLD: 4.13 M/UL
RBC # FLD: 12.8 %
SODIUM SERPL-SCNC: 148 MMOL/L
WBC # FLD AUTO: 4.5 K/UL

## 2025-07-24 ENCOUNTER — APPOINTMENT (OUTPATIENT)
Dept: RADIATION ONCOLOGY | Facility: CLINIC | Age: 55
End: 2025-07-24
Payer: COMMERCIAL

## 2025-07-24 ENCOUNTER — NON-APPOINTMENT (OUTPATIENT)
Age: 55
End: 2025-07-24

## 2025-07-24 VITALS
BODY MASS INDEX: 26.55 KG/M2 | WEIGHT: 196 LBS | HEIGHT: 72 IN | DIASTOLIC BLOOD PRESSURE: 93 MMHG | TEMPERATURE: 98.3 F | OXYGEN SATURATION: 96 % | HEART RATE: 96 BPM | RESPIRATION RATE: 18 BRPM | SYSTOLIC BLOOD PRESSURE: 159 MMHG

## 2025-07-24 DIAGNOSIS — C61 MALIGNANT NEOPLASM OF PROSTATE: ICD-10-CM

## 2025-07-24 PROCEDURE — 99213 OFFICE O/P EST LOW 20 MIN: CPT

## 2025-07-24 PROCEDURE — G2211 COMPLEX E/M VISIT ADD ON: CPT | Mod: NC

## 2025-09-17 ENCOUNTER — APPOINTMENT (OUTPATIENT)
Dept: HEMATOLOGY ONCOLOGY | Facility: CLINIC | Age: 55
End: 2025-09-17
Payer: COMMERCIAL

## 2025-09-17 ENCOUNTER — APPOINTMENT (OUTPATIENT)
Dept: INFUSION THERAPY | Facility: CLINIC | Age: 55
End: 2025-09-17

## 2025-09-17 VITALS
WEIGHT: 203 LBS | HEIGHT: 72 IN | BODY MASS INDEX: 27.5 KG/M2 | RESPIRATION RATE: 18 BRPM | OXYGEN SATURATION: 99 % | HEART RATE: 90 BPM | DIASTOLIC BLOOD PRESSURE: 87 MMHG | TEMPERATURE: 99.4 F | SYSTOLIC BLOOD PRESSURE: 146 MMHG

## 2025-09-17 PROCEDURE — 99204 OFFICE O/P NEW MOD 45 MIN: CPT

## 2025-09-18 ENCOUNTER — RX RENEWAL (OUTPATIENT)
Age: 55
End: 2025-09-18

## 2025-09-19 LAB
ALBUMIN SERPL ELPH-MCNC: 3.7 G/DL
ALP BLD-CCNC: 85 U/L
ALT SERPL-CCNC: 24 U/L
ANION GAP SERPL CALC-SCNC: 12 MMOL/L
AST SERPL-CCNC: 28 U/L
BILIRUB SERPL-MCNC: 1.8 MG/DL
BUN SERPL-MCNC: 13 MG/DL
CALCIUM SERPL-MCNC: 8.9 MG/DL
CHLORIDE SERPL-SCNC: 108 MMOL/L
CO2 SERPL-SCNC: 25 MMOL/L
CREAT SERPL-MCNC: 0.9 MG/DL
EGFRCR SERPLBLD CKD-EPI 2021: 100 ML/MIN/1.73M2
GLUCOSE SERPL-MCNC: 110 MG/DL
HCT VFR BLD CALC: 37 %
HGB BLD-MCNC: 12.9 G/DL
LYMPHOCYTES # BLD AUTO: 1 K/UL
LYMPHOCYTES NFR BLD AUTO: 23.4 %
MAN DIFF?: NO
MCHC RBC-ENTMCNC: 31.8 PG
MCHC RBC-ENTMCNC: 34.9 G/DL
MCV RBC AUTO: 91.1 FL
NEUTROPHILS # BLD AUTO: 2.9 K/UL
NEUTROPHILS NFR BLD AUTO: 68.3 %
PLATELET # BLD AUTO: 263 K/UL
POTASSIUM SERPL-SCNC: 4.3 MMOL/L
PROT SERPL-MCNC: 7 G/DL
PSA SERPL-MCNC: <0.01 NG/ML
RBC # BLD: 4.06 M/UL
RBC # FLD: 12.5 %
SODIUM SERPL-SCNC: 145 MMOL/L
WBC # FLD AUTO: 4.2 K/UL